# Patient Record
Sex: FEMALE | Race: WHITE | Employment: PART TIME | URBAN - METROPOLITAN AREA
[De-identification: names, ages, dates, MRNs, and addresses within clinical notes are randomized per-mention and may not be internally consistent; named-entity substitution may affect disease eponyms.]

---

## 2024-04-09 ENCOUNTER — OFFICE VISIT (OUTPATIENT)
Age: 34
End: 2024-04-09
Payer: OTHER GOVERNMENT

## 2024-04-09 VITALS
DIASTOLIC BLOOD PRESSURE: 70 MMHG | TEMPERATURE: 96 F | BODY MASS INDEX: 43.43 KG/M2 | OXYGEN SATURATION: 97 % | SYSTOLIC BLOOD PRESSURE: 120 MMHG | WEIGHT: 236 LBS | HEIGHT: 62 IN | HEART RATE: 70 BPM

## 2024-04-09 DIAGNOSIS — R51.9 INTRACTABLE HEADACHE, UNSPECIFIED CHRONICITY PATTERN, UNSPECIFIED HEADACHE TYPE: Primary | ICD-10-CM

## 2024-04-09 DIAGNOSIS — G93.2 PSEUDOTUMOR CEREBRI: ICD-10-CM

## 2024-04-09 PROCEDURE — 99204 OFFICE O/P NEW MOD 45 MIN: CPT | Performed by: PSYCHIATRY & NEUROLOGY

## 2024-04-09 RX ORDER — OMEPRAZOLE 20 MG/1
CAPSULE, DELAYED RELEASE ORAL
COMMUNITY
Start: 2024-02-29

## 2024-04-09 RX ORDER — ACETAZOLAMIDE 500 MG/1
500 CAPSULE, EXTENDED RELEASE ORAL 2 TIMES DAILY
Qty: 60 CAPSULE | Refills: 5 | Status: SHIPPED | OUTPATIENT
Start: 2024-04-09

## 2024-04-09 NOTE — PROGRESS NOTES
Tried Excedrin.  2019- patient saw an optometrist who noted bilateral papilledema and MRI brain showing pseudotumor cerebri features. Saw a neurologist. At [place]. CSF opening pressure 48 cm H2O. Spinal tap helped with the headache. Placed on Diazmox 500 mg BID. [facility] who said she has swelling on the R side. (+) weight gain 20 lbs. Currently getting 2/ week headaches lasting for several hrs. Excedrin sometimes helps.   Uses Retinoic acid.    Considerations include Idiopathic Intracranial Hyperternsion (IIH)( weight gain, papilledema, Retinoic acid, CSF opening pressure of 48)    RECOMMENDATIONS  1. I had a long discussion with patient and . Discussed diagnosis, pathophysiology prognosis, available treatment and formulating plan. Reviewed test results. All questions were answered.  2. Reviewed medical records from outside  3. Will start back on Diamox  mg BID. Monitor BP  4. Advise weight loss  5. Advise to stop Retinoic acid  6. Follow up with eye doctor to monitor R eye papilledema  7. Can take Excedrin prn to abort headache      Return in about 3 months (around 7/9/2024).       Thank you for the consultation      Shay Caal MD  Diplomate, American Board of Psychiatry and Neurology  Diplomate, Neuromuscular Medicine  Diplomate, American Board of Electrodiagnostic Medicine          CC: Ambrose Cantu MD  Fax: [phone]

## 2024-07-12 NOTE — PROGRESS NOTES
Neurology Progress Note    Patient ID:  Sloane Barajas  [id number]  33 y.o.  1990      Subjective:   History:  Sloane Barajas is a 33 y.o. female who  moved from [place] () who since 2020, has been having headaches 2-3 times/ week with nausea (-) vomiting (+) photophobia (+) phonophobia (+) blurred vision. Tried Excedrin.  2019- patient saw an optometrist who noted bilateral papilledema and MRI brain showing pseudotumor cerebri features. Saw a neurologist. At [place]. CSF opening pressure 48 cm H2O. Spinal tap helped with the headache. Placed on Diamox 500 mg BID. [facility] who said she has swelling on the R side. (+) weight gain 20 lbs. Currently getting 2/ week headaches lasting for several hrs. Excedrin sometimes helps.   Uses Retinoic acid.    Since last time, patient was started back on Diamox 500 mg BID with reduce headache to 1/ week but less intense. Having some change in taste and paresthesia but tolerable. She already stopped the retinoic acid.    Feels like she lost a little weight.    Has not seen eye doctor.      ROS:  Per HPI-  Otherwise the remainder of ROS was negative    Social Hx  Social History     Socioeconomic History    Marital status:        Meds:  Current Outpatient Medications on File Prior to Visit   Medication Sig Dispense Refill    omeprazole (PRILOSEC) 20 MG delayed release capsule       acetaZOLAMIDE (DIAMOX) 500 MG extended release capsule Take 1 capsule by mouth 2 times daily 60 capsule 5     No current facility-administered medications on file prior to visit.       Imaging:    CT Results (recent):  @[facility]FAN1996:1)@    MRI Results (recent):  @[facility](SKB2196:1)@    IR Results (recent):  @[facility](SKN9677:1)@    VAS/US Results (recent):  @[facility](FQX8811:1)@    Reviewed records in [facility] tab today    Lab Review     No visits with results within 3 Month(s) from this visit.   Latest known

## 2024-07-15 ENCOUNTER — OFFICE VISIT (OUTPATIENT)
Age: 34
End: 2024-07-15
Payer: OTHER GOVERNMENT

## 2024-07-15 VITALS
BODY MASS INDEX: 43.16 KG/M2 | HEART RATE: 64 BPM | OXYGEN SATURATION: 98 % | SYSTOLIC BLOOD PRESSURE: 110 MMHG | DIASTOLIC BLOOD PRESSURE: 60 MMHG | TEMPERATURE: 96 F | HEIGHT: 62 IN

## 2024-07-15 DIAGNOSIS — R51.9 INTRACTABLE HEADACHE, UNSPECIFIED CHRONICITY PATTERN, UNSPECIFIED HEADACHE TYPE: Primary | ICD-10-CM

## 2024-07-15 DIAGNOSIS — G93.2 PSEUDOTUMOR CEREBRI: ICD-10-CM

## 2024-07-15 PROCEDURE — 99214 OFFICE O/P EST MOD 30 MIN: CPT | Performed by: PSYCHIATRY & NEUROLOGY

## 2024-10-17 ENCOUNTER — HOSPITAL ENCOUNTER (OUTPATIENT)
Facility: HOSPITAL | Age: 34
Discharge: HOME | End: 2024-10-20
Payer: OTHER GOVERNMENT

## 2024-10-17 DIAGNOSIS — H53.8 OTHER VISUAL DISTURBANCES: ICD-10-CM

## 2024-10-17 DIAGNOSIS — H47.11 PAPILLEDEMA ASSOCIATED WITH INCREASED INTRACRANIAL PRESSURE: ICD-10-CM

## 2024-10-17 PROCEDURE — 70553 MRI BRAIN STEM W/O & W/DYE: CPT

## 2024-10-17 PROCEDURE — A9577 INJ MULTIHANCE: HCPCS | Performed by: OPHTHALMOLOGY

## 2024-10-17 PROCEDURE — 70544 MR ANGIOGRAPHY HEAD W/O DYE: CPT

## 2024-10-17 PROCEDURE — 6360000004 HC RX CONTRAST MEDICATION: Performed by: OPHTHALMOLOGY

## 2024-10-17 RX ADMIN — GADOBENATE DIMEGLUMINE 20 ML: 529 INJECTION, SOLUTION INTRAVENOUS at 10:16
